# Patient Record
Sex: MALE | Race: BLACK OR AFRICAN AMERICAN | Employment: FULL TIME | URBAN - METROPOLITAN AREA
[De-identification: names, ages, dates, MRNs, and addresses within clinical notes are randomized per-mention and may not be internally consistent; named-entity substitution may affect disease eponyms.]

---

## 2021-09-30 ENCOUNTER — HOSPITAL ENCOUNTER (OUTPATIENT)
Dept: PHYSICAL THERAPY | Age: 56
Discharge: HOME | End: 2021-09-30
Payer: COMMERCIAL

## 2021-09-30 PROCEDURE — 97112 NEUROMUSCULAR REEDUCATION: CPT

## 2021-09-30 PROCEDURE — 97530 THERAPEUTIC ACTIVITIES: CPT

## 2021-09-30 PROCEDURE — 97162 PT EVAL MOD COMPLEX 30 MIN: CPT

## 2021-09-30 NOTE — PROGRESS NOTES
PT DAILY TREATMENT NOTE/LUMBAR EVAL     Patient Name: Uzma Chauhan  Date:2021  : 1965  [x]  Patient  Verified  Payor: BLUE CROSS / Plan: Ivan Joel [id number] PPO / Product Type: PPO /    In time:1130  Out time:1220  Total Treatment Time (min): 50  Visit #: 1 of 12    Medicare/BCBS Only   Total Timed Codes (min):  25 1:1 Treatment Time:  50     Treatment Area: Low back pain [M54.5]  SUBJECTIVE  Pain Level (0-10 scale): 3  []constant []intermittent []improving []worsening []no change since onset    Any medication changes, allergies to medications, adverse drug reactions, diagnosis change, or new procedure performed?: [x] No    [] Yes (see summary sheet for update)  Subjective functional status/changes: Patient reports s/p LS laminectomy in  followed by PT with good results but has experienced return of LBP since 2021. MRI positive for spinal stenosis LS with neurogenic claudication. Pain is sharp at times and radiating down both posterior legs to calf region max 10/10. Patient more comfortable in seated flexed position with min pain 0/10. Pain triggered by walking, going up/down steps, prolonged standing > 20 min. Pain reduction with flexed postures. Mostly pain free at night. Patient denies any other red flags beside bilateral intermittent sharp leg pain. Patient being sent to PT for 3 weeks to prevent surgery and is scheduled for injection on 10/4/21.      PLOF: ADL without LBP  Limitations to PLOF: difficulty with walking/standing  Mechanism of Injury: gradual progressive since 2021  Current symptoms/Complaints: LBP and bilateral posterior leg pain  Previous Treatment/Compliance: PT in 2016 with good results  PMHx/Surgical Hx: 2016 LS surgery, right knee scope x2 in   Work Hx: still on pay roll as  but out on restrictions due to LBP  Living Situation: 2 level home  Pt Goals: Get rid of this pain  Barriers: []pain []financial []time []transportation []other  Motivation: good  Substance use: []Alcohol []Tobacco []other:   FABQ Score: []low []elevate  Cognition: A & O x 3    Other:    OBJECTIVE/EXAMINATION  Domestic Life: WNL  Activity/Recreational Limitations: none currently, not working currently due to LBP  Mobility: WFL,   Self Care: occasional difficulty putting on pants in standing , mild LB tightness when putting on shoes            25 min [x]Eval                  []Re-Eval         10 min Therapeutic Activity:  [x]  See flow sheet :instruction in HEP and POC   Rationale: patient education  to improve the patients ability to perform HEP and participate in rehab     15 min Neuromuscular Re-education:  [x]  See flow sheet :   Rationale: improve coordination, increase proprioception and LS ES inhibition, core activation, breathing ex  to improve the patients ability to perform ADL with improved alignment             With   [] TE   [x] TA   [] neuro   [] other: Patient Education: [x] Review HEP    [] Progressed/Changed HEP based on:   [] positioning   [] body mechanics   [] transfers   [] heat/ice application    [] other:      Other Objective/Functional Measures: as per eval    Physical Therapy Evaluation - Lumbar Spine ([facility])    SUBJECTIVE  Chief Complaint:LB and sharp sudden bilateral leg pain    Mechanism of injury:gradual onset of LBP    Symptoms:  Aggravated by:   [] Bending [] Sitting [x] Standing [x] Walking   [] Moving [x] Cough [x] Sneeze [] Valsalva   [x] AM  [x] PM  Lying:  [] sup   [] pro   [] sidelying   [] Other:     Eased by:    [x] Bending [x] Sitting [] Standing [] Walking   [] Moving [] AM  [] PM  Lying: [] sup  [] pro  [x] sidelying   [] Other:     General Health:  Red Flags Indicated? [] Yes    [x] No  [] Yes [] No Recent weight change (If yes, due to dieting?  [] Yes  [] No)   [] Yes [] No Weakness in legs during walking  [] Yes [] No Unremitting pain at night  [] Yes [] No Abdominal pain or problems  [] Yes [] No Rectal bleeding  [] Yes [] No Feet more cold or painful in cold weather  [] Yes [] No Menstrual irregularities  [] Yes [] No Blood or pain with urination  [] Yes [] No Dysfunction of bowel or bladder  [] Yes [] No Recent illness within past 3 weeks (i.e, cold, flu)  [] Yes [] No Numbness/tingling in buttock/genitalia region    Past History/Treatments:PT post laminectomy in 2016     Diagnostic Tests: [] Lab work [x] X-rays    [] CT [x] MRI     [] Other:  Results:LS stenosis    Functional Status  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  Present functional limitations:limited tolerance to standing/walking, heavy lifting  What position do you sleep in?:S/L    OBJECTIVE  Posture:erect posture  Lateral Shift: [] R    [] L     [] +  [] -  Kyphosis: [] Increased [] Decreased   []  WNL  Lordosis:  [x] Increased [] Decreased   [] WNL  Pelvic symmetry: [x] WNL    [] Other:    Gait:  [] Normal     [x] Abnormal:mild guarding, reduction in trunk rotation/arm swing    Active Movements: [] N/A   [] Too acute   [] Other:  ROM % AROM % PROM Comments:pain, area   Forward flexion 40-60 5 in  LS tightness   Extension 20-30 25%  discomfort   SB right 20-30 22 in     SB left 20-30 23 in  LBP, no leg pain   Rotation right 5-10 19 in  LB tightness   Rotation left 5-10 18.5 in  LB tightness     Neuro Screen [x] WNL  Myotome/Dermatome/Reflexes:  Comments:    Dural Mobility:  SLR Sitting: [x] R    [x] L    [x] +    [] -  @ (degrees):pos in combination with slump test however not sharp, pain in both legs           Supine: [] R    [] L    [] +    [x] -  @ (degrees): SLR to 70 deg bilaterally, patient unable to differentiate between HS tightness or radiating pain  Slump Test: [] R    [] L    [] +    [x] -  @ (degrees): as above  Prone Knee Bend: [] R    [] L    [] +    [] -     Palpation: no TTP LS  [] Min  [] Mod  [] Severe    Location:  [] Min  [] Mod  [] Severe    Location:  [] Min  [] Mod  [] Severe    Location:    Strength   L(0-5) R (0-5) N/T   Hip Flexion (L1,2) 5 5 []   Knee Extension (L3,4) 5 5 []   Ankle Dorsiflexion (L4) 5 5 []   Great Toe Extension (L5) 5 5 []   Ankle Plantarflexion (S1) 5 5 []   Knee Flexion (S1,2) 5 5 []   Upper Abdominals 4 4 []   Lower Abdominals 4 4 []   Paraspinals   []   Back Rotators   []   Gluteus Luis Fernando 5 5 []   Other   []     Special Tests  Lumbar:  Lumb. Compression: [] Pos  [] Neg               Lumbar Distraction:   [] Pos  [] Neg    Quadrant:  [] Pos  [] Neg   [] Flex  [] Ext    Sacroilliac:  Gaenslen's: [] R    [] L    [] +    [] -     Compression: [] +    [] -     Gapping:  [] +    [] -     Thigh Thrust: [] R    [] L    [] +    [] -     Leg Length: [] +    [] -   Position:    Crests:    ASIS:    PSIS:    Sacral Sulcus:    Mobility: Standing flex:     Sitting flex:     Supine to sit:     Prone knee bend:         Hip: Eliverto Leech:  [] R    [] L    [] +    [] -     Scour:  [] R    [] L    [] +    [] -     Piriformis: [x] R    [x] L    [x] +    [] -          Deficits: Giuliano's: [x] R    [x] L    [x] +    [] -     Francisco: [x] R    [x] L    [x] +    [] -     Hamstrings 90/90:70/70    Gastrocsoleus (to neutral): Right: Left:       Global Muscular Weakness:  Abdominals:mild weakness  Quadratus Lumborum:tight  Paraspinals:LS tight  Other:bilateral hip tightness into IR    Other tests/comments:  hip IR both 20 deg with LBP at end range  Shouler Flex both 150 deg  HGIR both 45  ADT + bilaterally to midline, marked hip Flex tightness making it difficult to perform testing       Pain Level (0-10 scale) post treatment: 4    ASSESSMENT/Changes in Function: Patient able to perform all activities without reports of radicular leg pain however reports increase in LS stiffness with ambulation post session    Patient will continue to benefit from skilled PT services to address ROM deficits, address strength deficits, analyze and address soft tissue restrictions and analyze and cue movement patterns to attain remaining goals.      [x] See Plan of Care  []  See progress note/recertification  []  See Discharge Summary         Progress towards goals / Updated goals:  Patient has good understanding of initial HEP and POC    PLAN  []  Upgrade activities as tolerated     [x]  Continue plan of care  []  Update interventions per flow sheet       []  Discharge due to:_  []  Other:_      Fazal Abdullahi, STONE 9/30/2021  11:28 AM

## 2021-09-30 NOTE — PROGRESS NOTES
[facility] at the [address], [place], [address] [facility]  Phone: [phone]      Fax:  [phone]             Plan of Care/ Statement of Necessity for Physical Therapy Services      Patient name: Jamia Benavidez Start of Care: 2021   Referral source: Seda Bello : 1965    Medical Diagnosis: Low back pain [M54.5]   Onset Date:2021    Treatment Diagnosis: LBP, bilateral radicular leg pain   Prior Hospitalization: see medical history Provider#: [id number]   Medications: Verified on Patient summary List    Comorbidities: s/p LS laminectomy 2016, right knee scope x2 in    Prior Level of Function: full function, full time work at [employer] as , no LBP with standing/walking      The Plan of Care and following information is based on the information from the initial evaluation. Assessment/ key information: 50 YOM presenting to PT with c/o LB and intermittent sharp leg pain ranging from 0-10/10  since 2021. Recent x-rays and MRI indicative of LS stenosis. Patient reports most pain with standing, walking and heavy lifting. He is currently not working due to LBP. Patient denies any red flags except intermittent bilateral sharp burning pain in both posterior legs. Objective findings include limited LS ROM in all planes with pain during SB/Ext, symptoms are alleviated with spinal Flex, bilateral hamstring tightness, deficit in hip ROM (noel IR/Ext), deficit in trunk/shoulder mobility, erect posture with mild LS hyperlordosis, mild core strength deficit, guarding with ambulation, positive SLR testing in seated position in combination with Slump test. Symptoms are consistent with mechanical LBP and patient is a good candidate for skilled PT to address deficits in function.     Evaluation Complexity History MEDIUM  Complexity : 1-2 comorbidities / personal factors will impact the outcome/ POC ; Examination MEDIUM Complexity : 3 Standardized tests and measures addressing body structure, function, activity limitation and / or participation in recreation  ;Presentation MEDIUM Complexity : Evolving with changing characteristics  ; Clinical Decision Making MEDIUM Complexity : FOTO score of 26-74 FOTO score = an established functional score where 100 = no disability   Overall Complexity Rating: MEDIUM  Problem List: decrease ROM, decrease strength and decrease activity tolerance   Treatment Plan may include any combination of the following: Therapeutic exercise, Therapeutic activities, Neuromuscular re-education, Physical agent/modality, Manual therapy and Patient education  Vasopnuematic compression justification:  Per bilateral girth measures taken and listed above the edema is considered significant and having an impact on the patient's strength, ADLs and work duties, material handling  Patient / Family readiness to learn indicated by: trying to perform skills and interest  Persons(s) to be included in education: patient (P)  Barriers to Learning/Limitations: None  Patient Goal (s): Get rid of this pain  Patient Self Reported Health Status: good  Rehabilitation Potential: good    Short Term Goals: To be accomplished in 2 weeks:   1. Patient is compliant with daily HEP to address mechanical deficits and assure progress towards goals  Status at Eval: HEP initiated, discussed deficits    2. Patient demonstrates improved trunk rotation bilaterally to <or= 15 in indicating mobility needed for ADL and ambulation  Status at Eval: trunk rotation limited left 18.5\", right 19 in with LBP, stiffness with ambulation    3. Patient demonstrates HGIR to WNL and negative adduction drop test indicating  rib cage/hip mobility needed for ADL and gait  Status at Eval: HGIR 45 deg bilaterally, ADT positive bilaterally    Long Term Goals: To be accomplished in 4 weeks:   1.  Patient reports improved FOTO score to >or= 57/100 indicating improved tolerance to standing , walking and material handling to prepare patient for RTW  Status at [facility]:FOTO 42/100, limited standing to 20 min, pain triggered with ambulation    2. Patient reports reduction in pain to <or= 5/10 and reduction in leg pain episodes by >or= 50% for increased ease with ADL  Status at [facility]: pain ranging 0-10/10 with intermittent sharp burning bilateral leg pain     3. Patient demonstrates ability to perform SLR to >or= 70 deg without reports of increase in LB/leg pain to allow return to PLOF  Status at [facility]: SLR seated positive in combination with Slump test, Supine SLR limited to 70 deg with c/o LB/bilateral leg pain      Frequency / Duration: Patient to be seen 2 times per week for 4 weeks. Patient/ Caregiver education and instruction: Diagnosis, prognosis, exercises   [x]  Plan of care has been reviewed with PTA    Certification Period: n/a  Hubert Hicks, PT 9/30/2021 12:47 PM  _____________________________________________________________________  I certify that the above Therapy Services are being furnished while the patient is under my care. I agree with the treatment plan and certify that this therapy is necessary.     [facility] Signature:____________Date:_________TIME:________                                      [facility]    ** Signature, Date and Time must be completed for valid certification **    Please sign and return to [facility] at the [address], [facility] [place], [address] [facility]       Phone: [phone]      Fax:  [phone]

## 2021-10-14 ENCOUNTER — HOSPITAL ENCOUNTER (OUTPATIENT)
Dept: PHYSICAL THERAPY | Age: 56
Discharge: HOME | End: 2021-10-14
Payer: COMMERCIAL

## 2021-10-14 PROCEDURE — 97110 THERAPEUTIC EXERCISES: CPT

## 2021-10-14 PROCEDURE — 97530 THERAPEUTIC ACTIVITIES: CPT

## 2021-10-14 PROCEDURE — 97112 NEUROMUSCULAR REEDUCATION: CPT

## 2021-10-14 NOTE — PROGRESS NOTES
PT DAILY TREATMENT NOTE    Patient Name: Katerina Wick  Date:10/14/2021  : 1965  [x]  Patient  Verified  Payor: BLUE CROSS / Plan: Ivan Joel [id number] PPO / Product Type: PPO /    In time:12:16pm  Out time:1:05  Total Treatment Time (min): 49  Total Timed Codes (min): 49  1:1 Treatment Time (MC/BCBS only): 49   Visit #: 2 of 12    Treatment Dx:  Low back pain [M54.5]    SUBJECTIVE  Pain Level (0-10 scale): 3  Any medication changes, allergies to medications, adverse drug reactions, diagnosis change, or new procedure performed?: [x] No    [] Yes (see summary sheet for update)  Subjective functional status/changes:   [] No changes reported  The pt reported that he received a Cortizone shot approx 2 weeks that reduced the peripheralization symptom intensity butt not causing the symptoms of burning to go down the leg past     OBJECTIVE    20 min Therapeutic Exercise:  [x] See flow sheet :   Rationale: increase ROM and increase strength to improve the patients ability to return to PLOF    12 min Therapeutic Activity:  [x]  See flow sheet :    Rationale: increase ROM, increase strength and improve coordination  to improve the patients ability to perform daily activities with decreased pain and symptom levels     17 min Neuromuscular Re-education:  [x]  See flow sheet : Pt required multiple tactile cues, verbal cues, and demo for new MIHAI exercises in 90-90 position    Rationale: increase ROM, increase strength, improve coordination and increase proprioception  to improve the patients ability to perform daily activities with decreased pain and symptom levels    With   [] TE   [x] TA   [] neuro   [] other: Patient Education: [x] Review HEP; pt education of centralization and peripheralization; pt education of delayed onset muscle soreness; pt education of importance for completing HEP on a regular basis   [] Progressed/Changed HEP based on:   [] positioning   [] body mechanics   [] transfers   [] heat/ice application    [] other:      Other Objective/Functional Measures: updated pain goal      Pain Level (0-10 scale) post treatment: 4    ASSESSMENT/Changes in Function: The pt reported to therapy with a pleasant attitude and ready to participate in therapeutic exercises. Patient tolerated treatment session well today. Patient had no complaints with the new exercise program, but reported a slight increase in LBP and stiffness. Provided pt education discussed above  Patient continues to make slow progress toward goals and would benefit from continued skilled PT intervention to address remaining deficits outlined in goals below. Patient will continue to benefit from skilled PT services to modify and progress therapeutic interventions, address functional mobility deficits, address ROM deficits, address strength deficits, analyze and address soft tissue restrictions, analyze and cue movement patterns, analyze and modify body mechanics/ergonomics and assess and modify postural abnormalities to attain remaining goals. [x]  See Plan of Care  []  See progress note/recertification  []  See Discharge Summary         Progress towards goals / Updated goals:  Short Term Goals: To be accomplished in 2 weeks:              1. Patient is compliant with daily HEP to address mechanical deficits and assure progress towards goals  Status at Eval: HEP initiated, discussed deficits     2. Patient demonstrates improved trunk rotation bilaterally to <or= 15 in indicating mobility needed for ADL and ambulation  Status at Eval: trunk rotation limited left 18.5\", right 19 in with LBP, stiffness with ambulation     3. Patient demonstrates HGIR to WNL and negative adduction drop test indicating  rib cage/hip mobility needed for ADL and gait  Status at Eval: HGIR 45 deg bilaterally, ADT positive bilaterally     Long Term Goals:  To be accomplished in 4 weeks:              1. 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 Patient reports reduction in pain to <or= 5/10 and reduction in leg pain episodes by >or= 50% for increased ease with ADL  Status at [facility]: pain ranging 0-10/10 with intermittent sharp burning bilateral leg pain   Current 10/14/21: worst pain in the last week 10/10; average pain in the last week = 3/10 - progressing      3.  Patient demonstrates ability to perform SLR to >or= 70 deg without reports of increase in LB/leg pain to allow return to PLOF  Status at [facility]: SLR seated positive in combination with Slump test, Supine SLR limited to 70 deg with c/o LB/bilateral leg pain    PLAN  []  Upgrade activities as tolerated     [x]  Continue plan of care  []  Update interventions per flow sheet       []  Discharge due to:_  []  Other:_      Cheryl Langley, PT 10/14/2021  12:21 PM    Future Appointments   Date Time Provider [place]   10/19/2021 12:15 PM Isabel RuizHPNATHANW [facility]   10/21/2021 11:30 AM Man Tran PT [facility]   10/26/2021 10:45 AM Man Tran PT [facility]   10/28/2021 11:30 AM Man Tran PT [facility]   11/2/2021 10:00 AM Man Tran PT [facility]   11/4/2021 10:00 AM Man Tran PT [facility]   11/9/2021 10:45 AM Man Tran PT [facility]   11/11/2021 10:45 AM Merritt Thomas PT [facility]   11/16/2021 10:00 AM Man Tran PT [facility]   11/18/2021 10:00 AM Man Tran PT [facility]

## 2021-10-19 ENCOUNTER — APPOINTMENT (OUTPATIENT)
Dept: PHYSICAL THERAPY | Age: 56
End: 2021-10-19
Payer: COMMERCIAL

## 2021-10-21 ENCOUNTER — HOSPITAL ENCOUNTER (OUTPATIENT)
Dept: PHYSICAL THERAPY | Age: 56
Discharge: HOME | End: 2021-10-21
Payer: COMMERCIAL

## 2021-10-21 PROCEDURE — 97110 THERAPEUTIC EXERCISES: CPT

## 2021-10-21 PROCEDURE — 97140 MANUAL THERAPY 1/> REGIONS: CPT

## 2021-10-21 PROCEDURE — 97112 NEUROMUSCULAR REEDUCATION: CPT

## 2021-10-21 NOTE — PROGRESS NOTES
PT DAILY TREATMENT NOTE    Patient Name: Crispin Mcmahon  Date:10/21/2021  : 1965  [x]  Patient  Verified  Payor: BLUE CROSS / Plan: [facility] PPO / Product Type: PPO /    In time:1130  Out time:1222  Total Treatment Time (min): 52  Total Timed Codes (min): 52  1:1 Treatment Time (MC/BCBS only): 52   Visit #: 3 of 12    Treatment Dx: Low back pain [M54.5]    SUBJECTIVE  Pain Level (0-10 scale): 1 \"It is more tightness than pain\"  Any medication changes, allergies to medications, adverse drug reactions, diagnosis change, or new procedure performed?: [x] No    [] Yes (see summary sheet for update)  Subjective functional status/changes:   [] No changes reported  Patient reports reduction of leg pain. \"The pain is not radiating down as far\"  He reporting increased buttock pain right at sight of injection which patient is also feeling at rest    OBJECTIVE        10 min Therapeutic Exercise:  [x] See flow sheet :   Rationale: increase ROM, increase strength and improve coordination to improve the patients ability to perform ADL with more ease       25 min Neuromuscular Re-education:  [x]  See flow sheet :   Rationale: increase ROM, increase strength, improve coordination, increase proprioception and repositioning  to improve the patients ability to perform ADL with improved alignment and reduction in LS compensation    10 min Manual Therapy:   Sternal mobilization with active pelvic tilt, Superior T4 (MIHAI technique), Right subclavius release (MIHAI technique), MIHAI infraclavicular rib pump with active breath , rib mobs in combination with AA shoulder Flex  Obtained consent for hand placement    Rationale: decrease pain, increase ROM and increase tissue extensibility to improve the patients ability to perform ADL  The manual therapy interventions were performed at a separate and distinct time from the therapeutic activities interventions.               With   [] TE   [] TA   [] neuro   [] other: Patient Education: [x] Review HEP    [] Progressed/Changed HEP based on:   [] positioning   [] body mechanics   [] transfers   [] heat/ice application    [] other:      Other Objective/Functional Measures:   MIHAI Special Tests (pre/post)  HGIR:                                                 Right: 60/80             Left: 40/70  Shoulder Flexion   Right: 125/159             Left: 130/155  Hip Add Drop Test:                           Right: +/+            Left:+/+  Trunk Rot                                           Right: 18.5/16.5    Left 19 /17  Extension Drop                                   Right : +          Left: -  SLR both 80 deg with reports of right buttock pain/tightness    Pain Level (0-10 scale) post treatment: 0    ASSESSMENT/Changes in Function: improved measurements post session, reduction in pain, improved awareness of deficits in mobility    Patient will continue to benefit from skilled PT services to address ROM deficits, address strength deficits, analyze and address soft tissue restrictions, analyze and cue movement patterns and assess and modify postural abnormalities to attain remaining goals. [x]  See Plan of Care  []  See progress note/recertification  []  See Discharge Summary         Progress towards goals / Updated goals:  Short Term Goals: To be accomplished in 2 weeks:              5. Patient is compliant with daily HEP to address mechanical deficits and assure progress towards goals  Status at Eval: HEP initiated, discussed deficits     2. Patient demonstrates improved trunk rotation bilaterally to <or= 15 in indicating mobility needed for ADL and ambulation  Status at Eval: trunk rotation limited left 18.5\", right 19 in with LBP, stiffness with ambulation     3.  Patient demonstrates HGIR to WNL and negative adduction drop test indicating  rib cage/hip mobility needed for ADL and gait  Status at Eval: HGIR 45 deg bilaterally, ADT positive bilaterally  Current status 10/21/21: Kunal Yousif almost cleared, ADT still + post session, progressing     Long Term Goals: To be accomplished in 4 weeks:              1. Patient reports improved FOTO score to >or= 57/100 indicating improved tolerance to standing , walking and material handling to prepare patient for RTW  Status at [facility]:FOTO 42/100, limited standing to 20 min, pain triggered with ambulation     2. Patient reports reduction in pain to <or= 5/10 and reduction in leg pain episodes by >or= 50% for increased ease with ADL  Status at [facility]: pain ranging 0-10/10 with intermittent sharp burning bilateral leg pain   Current 10/14/21: worst pain in the last week 10/10; average pain in the last week = 3/10 - progressing      3.  Patient demonstrates ability to perform SLR to >or= 70 deg without reports of increase in LB/leg pain to allow return to PLOF  Status at [facility]: SLR seated positive in combination with Slump test, Supine SLR limited to 70 deg with c/o LB/bilateral leg pain   *    PLAN  []  Upgrade activities as tolerated     [x]  Continue plan of care  []  Update interventions per flow sheet       []  Discharge due to:_  []  Other:_      Pati Cho, PT 10/21/2021  11:45 AM    Future Appointments   Date Time Provider [place]   10/26/2021 10:45 AM Boyd Bonilla PT [facility]   10/28/2021 11:30 AM Boyd Bonilla PT [facility]   11/2/2021 10:00 AM Boyd Bonilla PT [facility]PTBW [facility]   11/4/2021 10:00 AM Boyd Bonilla PT [facility]PTBW [facility]   11/9/2021 10:45 AM Boyd Bonilla PT [facility]   11/11/2021 10:45 AM Karen Diallo PT [facility]PNATHANW [facility]   11/16/2021 10:00 AM Boyd Bonilla PT [facility]   11/18/2021 10:00 AM Boyd Bonilla PT [facility]PTBW [facility]

## 2021-10-26 ENCOUNTER — HOSPITAL ENCOUNTER (OUTPATIENT)
Dept: PHYSICAL THERAPY | Age: 56
Discharge: HOME | End: 2021-10-26
Payer: COMMERCIAL

## 2021-10-26 PROCEDURE — 97110 THERAPEUTIC EXERCISES: CPT

## 2021-10-26 PROCEDURE — 97530 THERAPEUTIC ACTIVITIES: CPT

## 2021-10-26 PROCEDURE — 97112 NEUROMUSCULAR REEDUCATION: CPT

## 2021-10-26 NOTE — PROGRESS NOTES
PT DAILY TREATMENT NOTE    Patient Name: Edenilson Avina  Date:10/26/2021  : 1965  [x]  Patient  Verified  Payor: BLUE CROSS / Plan: [facility] PPO / Product Type: PPO /    In time:1050  Out time:1130  Total Treatment Time (min): 40  Total Timed Codes (min): 40  1:1 Treatment Time (MC/BCBS only): 40   Visit #: 4 of 12    Treatment Dx:  Low back pain [M54.5]    SUBJECTIVE  Pain Level (0-10 scale): 3 LBP  Any medication changes, allergies to medications, adverse drug reactions, diagnosis change, or new procedure performed?: [x] No    [] Yes (see summary sheet for update)  Subjective functional status/changes:   [] No changes reported  Reports brief episode of sharp pain 10/10 this morning with radiating pain into  calf in both legs as he was walking downstairs    OBJECTIVE          10 min Therapeutic Exercise:  [x] See flow sheet :   Rationale: increase ROM, increase strength and improve coordination to improve the patients ability to perform ADL    10 min Therapeutic Activity:  [x]  See flow sheet :review of HEP to be used for pain control as needed, also review of log roll with bed transfers and improved spinal alignment when playing pool   Rationale: patient education  to improve the patients ability to perform ADL with more ease     20 min Neuromuscular Re-education:  [x]  See flow sheet :   Rationale: increase ROM, increase strength, improve coordination, increase proprioception and repositioning  to improve the patients ability to perform ADL with improved mechanics and alignment            With   [] TE   [x] TA   [] neuro   [] other: Patient Education: [x] Review HEP    [] Progressed/Changed HEP based on:   [] positioning   [] body mechanics   [] transfers   [] heat/ice application    [] other:      Other Objective/Functional Measures: bilateral hamstring tightness right <left, negative SLR     Pain Level (0-10 scale) post treatment: 0    ASSESSMENT/Changes in Function: challenged with advanced core ex however no increase in pain with TE, reports residual LS tightness but no LBP    Patient will continue to benefit from skilled PT services to address strength deficits, analyze and address soft tissue restrictions, analyze and cue movement patterns and assess and modify postural abnormalities to attain remaining goals. [x]  See Plan of Care  []  See progress note/recertification  []  See Discharge Summary         Progress towards goals / Updated goals:  Short Term Goals: To be accomplished in 2 weeks:              9. Patient is compliant with daily HEP to address mechanical deficits and assure progress towards goals  Status at Eval: HEP initiated, discussed deficits  Current status 10/26/21: reviewed HEP and use for pain control throughout the day     2. Patient demonstrates improved trunk rotation bilaterally to <or= 15 in indicating mobility needed for ADL and ambulation  Status at Eval: trunk rotation limited left 18.5\", right 19 in with LBP, stiffness with ambulation  Current status 10/26/21: trunk rotation after PT session left 16 in, right 16. 5, progrssing      3. Patient demonstrates HGIR to WNL and negative adduction drop test indicating  rib cage/hip mobility needed for ADL and gait  Status at [facility]: HGIR 45 deg bilaterally, ADT positive bilaterally  Current status 10/21/21: HGIR almost cleared, ADT still + post session, progressing     Long Term Goals: To be accomplished in 4 weeks:              1. Patient reports improved FOTO score to >or= 57/100 indicating improved tolerance to standing , walking and material handling to prepare patient for RTW  Status at [facility]:FOTO 42/100, limited standing to 20 min, pain triggered with ambulation     2.  Patient reports reduction in pain to <or= 5/10 and reduction in leg pain episodes by >or= 50% for increased ease with ADL  Status at Eval: pain ranging 0-10/10 with intermittent sharp burning bilateral leg pain   Current 10/14/21: worst pain in the last week 10/10; average pain in the last week = 3/10 - progressing        3.  Patient demonstrates ability to perform SLR to >or= 70 deg without reports of increase in LB/leg pain to allow return to PLOF  Status at Eval: SLR seated positive in combination with Slump test, Supine SLR limited to 70 deg with c/o LB/bilateral leg pain  Current status 10/26/21:SLR left 75 deg, right 80 deg without reports of buttock, negative SLR, MET      PLAN  []  Upgrade activities as tolerated     [x]  Continue plan of care  []  Update interventions per flow sheet       []  Discharge due to:_  []  Other:_      Dontrell Cabrera, PT 10/26/2021  11:18 AM    Future Appointments   Date Time Provider [place]   10/28/2021 11:30 AM Cee Jeffrey, PT [facility]I [facility]   11/2/2021 10:00 AM Cee Jeffrey, PT [facility]   11/4/2021 10:00 AM Cee Jeffrey, PT [facility]PTBW [facility]   11/9/2021 10:45 AM Cee Jeffrey, PT [facility]PTBW [facility]   11/11/2021 10:45 AM Priyank Wang, PT [facility]PTBW [facility]   11/16/2021 10:00 AM Cee Jeffrey, PT [facility]   11/18/2021 10:00 AM Cee Jeffrey, PT [facility]

## 2021-11-02 ENCOUNTER — HOSPITAL ENCOUNTER (OUTPATIENT)
Dept: PHYSICAL THERAPY | Age: 56
Discharge: HOME | End: 2021-11-02
Payer: COMMERCIAL

## 2021-11-02 PROCEDURE — 97110 THERAPEUTIC EXERCISES: CPT

## 2021-11-02 PROCEDURE — 97140 MANUAL THERAPY 1/> REGIONS: CPT

## 2021-11-02 PROCEDURE — 97112 NEUROMUSCULAR REEDUCATION: CPT

## 2021-11-02 NOTE — PROGRESS NOTES
[facility] at the [address], [place], [address] [facility]  Phone: [phone]      Fax:  [phone]    Progress Note  Patient name: Marycruz Fall Start of Care: 2021   Referral source: Lala Fernández [place] : 1965               Medical Diagnosis: Low back pain [M54.5]    Onset Date:2021               Treatment Diagnosis: LBP, bilateral radicular leg pain   Prior Hospitalization: see medical history Provider#: [id number]   Medications: Verified on Patient summary List    Comorbidities: s/p LS laminectomy 2016, right knee scope x2 in    Prior Level of Function: full function, full time work at [employer] as , no LBP with standing/walking                              Visits from Start of Care: 5    Missed Visits: 2    Progress towards goals / Updated goals:  Short Term Goals: To be accomplished in 2 weeks:              1. Patient is compliant with daily HEP to address mechanical deficits and assure progress towards goals  Status at Eval: HEP initiated, discussed deficits  Current status 10/26/21: reviewed HEP and use for pain control throughout the day     2. Patient demonstrates improved trunk rotation bilaterally to <or= 15 in indicating mobility needed for ADL and ambulation  Status at Eval: trunk rotation limited left 18.5\", right 19 in with LBP, stiffness with ambulation  Current status 21: trunk rotation after PT session 17 in bilaterally, progrssing      3. Patient demonstrates HGIR to WNL and negative adduction drop test indicating  rib cage/hip mobility needed for ADL and gait  Status at Eval: HGIR 45 deg bilaterally, ADT positive bilaterally  Current status 21: Maridee Heart  cleared left, almost cleared right, ADT negative post session, progressing     Long Term Goals: To be accomplished in 4 weeks:              1.  Patient reports improved FOTO score to >or= 57/100 indicating improved tolerance to standing , walking and material handling to prepare patient for RTW  Status at [facility]:FOTO 42/100, limited standing to 20 min, pain triggered with ambulation  Current status: pending     2. Patient reports reduction in pain to <or= 5/10 and reduction in leg pain episodes by >or= 50% for increased ease with ADL  Status at [facility]: pain ranging 0-10/10 with intermittent sharp burning bilateral leg pain   Current status 11/2/21: patient reports recent increase in pain with radiating pain into both upper legs however reported 4/10 pain at beginning of today's session and reduction to 2/10 post session without any radicular pain/symptoms, progressing        3. Patient demonstrates ability to perform SLR to >or= 70 deg without reports of increase in LB/leg pain to allow return to PLOF  Status at [facility]: SLR seated positive in combination with Slump test, Supine SLR limited to 70 deg with c/o LB/bilateral leg pain  Current status 10/26/21:SLR left 75 deg, right 80 deg without reports of buttock, negative SLR, MET       Key Functional Changes: reduction in leg pain with ADL  Updated Goals: to be achieved in 4 weeks:  LTG # 4/ Updated goal as of 11/2/21: patient demonstrates ability to perform material handling including pushing, pulling and lifting >or= 40# on occasion to prepare patient for RTW    ASSESSMENT/RECOMMENDATIONS:Mr. Cece Bhakta has attended a total of 5 visits including initial evaluation and has shown improvement in functional mobility, flexibility, reduction in radicular pain and negative SLR testing. He continues with fluctuating pain levels and recently reports increase in bilateral leg pain however continues to respond well to therapy with marked reduction in LBP and alleviation of radicular symptoms. I recommend to continue PT to address remaining goals.     [x]Continue therapy per initial plan/protocol at a frequency of  2 x per week for 4 weeks    Thank you for this referral.   Laly Collins, PT 11/2/2021 12:06 PM

## 2021-11-09 ENCOUNTER — HOSPITAL ENCOUNTER (OUTPATIENT)
Dept: PHYSICAL THERAPY | Age: 56
Discharge: HOME | End: 2021-11-09
Payer: COMMERCIAL

## 2021-11-09 PROCEDURE — 97140 MANUAL THERAPY 1/> REGIONS: CPT

## 2021-11-09 PROCEDURE — 97112 NEUROMUSCULAR REEDUCATION: CPT

## 2021-11-09 PROCEDURE — 97110 THERAPEUTIC EXERCISES: CPT

## 2021-11-09 NOTE — PROGRESS NOTES
PT DAILY TREATMENT NOTE    Patient Name: Nikita Peterson  Date:2021  : 1965  [x]  Patient  Verified  Payor: BLUE CROSS / Plan: [facility] PPO / Product Type: PPO /    In time:1043  Out time:1130  Total Treatment Time (min): 47  Total Timed Codes (min): 47  1:1 Treatment Time (MC/BCBS only): 47   Visit #: 6 of 13    Treatment Dx: Other low back pain [M54.59]    SUBJECTIVE  Pain Level (0-10 scale): 3  Any medication changes, allergies to medications, adverse drug reactions, diagnosis change, or new procedure performed?: [x] No    [] Yes (see summary sheet for update)  Subjective functional status/changes:   [] No changes reported  Reports most pain when standing or walking, severe pain when doing dishes on  , reports shooting pain to the back of his knees, pain resolved once seated    OBJECTIVE    10 min Therapeutic Exercise:  [x] See flow sheet :   Rationale: increase ROM, increase strength and improve coordination to improve the patients ability to perform ADL       29 min Neuromuscular Re-education:  [x]  See flow sheet :   Rationale: increase ROM, increase strength, improve coordination, increase proprioception and repositioning  to improve the patients ability to perform ADL with improved alignment    8 min Manual Therapy:  Sternal mobilization with active pelvic tilt,  Right subclavius release (MIHAI technique), MIHAI infraclavicular rib pump with active breath   Obtained consent for hand placement    Rationale: decrease pain, increase ROM and increase tissue extensibility to improve the patients ability to perform ADL with improved rib cage mobility  The manual therapy interventions were performed at a separate and distinct time from the therapeutic activities interventions.     With   [] TE   [] TA   [] neuro   [] other: Patient Education: [x] Review HEP    [] Progressed/Changed HEP based on:   [] positioning   [] body mechanics   [] transfers   [] heat/ice application    [] other: Other Objective/Functional Measures:   -patient very challenged with HS and PPT while reaching overhead  Pre/post measurements:  FFD 5 in/3 in  LTR both 18 in/  Left 16.5 in, right 16  SLR both 65 without leg pain/left 80, right 85  HGIR post session left 80, right 85  ADT both +, hip Flex tightness both +/NT     Pain Level (0-10 scale) post treatment: 2    ASSESSMENT/Changes in Function: improved measurements post session indicating improvement in rib cage mobility and lumbopelvic alignment however almost immediate slight increase in LBP once patient is in upright position/walking    Patient will continue to benefit from skilled PT services to address ROM deficits, address strength deficits, analyze and address soft tissue restrictions, analyze and cue movement patterns and assess and modify postural abnormalities to attain remaining goals. [x]  See Plan of Care  []  See progress note/recertification  []  See Discharge Summary         Progress towards goals / Updated goals:  Short Term Goals: To be accomplished in 2 weeks:              1. Patient is compliant with daily HEP to address mechanical deficits and assure progress towards goals  Status at Eval: HEP initiated, discussed deficits  Current status 10/26/21: reviewed HEP and use for pain control throughout the day     2. Patient demonstrates improved trunk rotation bilaterally to <or= 15 in indicating mobility needed for ADL and ambulation  Status at Eval: trunk rotation limited left 18.5\", right 19 in with LBP, stiffness with ambulation  Current status 11/9/21: trunk rotation after PT session 16.5 left, 16 in right, progrssing      3.  Patient demonstrates HGIR to WNL and negative adduction drop test indicating  rib cage/hip mobility needed for ADL and gait  Status at [facility]: HGIR 45 deg bilaterally, ADT positive bilaterally  Current status 11/2/21: Rosea Friends  cleared left, almost cleared right, ADT negative post session, progressing     Long Term Goals: To be accomplished in 4 weeks:              1.  Patient reports improved FOTO score to >or= 57/100 indicating improved tolerance to standing , walking and material handling to prepare patient for RTW  Status at [facility]:FOTO 42/100, limited standing to 20 min, pain triggered with ambulation  Current status:[place]     2. Patient reports reduction in pain to <or= 5/10 and reduction in leg pain episodes by >or= 50% for increased ease with ADL  Status at [facility]: pain ranging 0-10/10 with intermittent sharp burning bilateral leg pain   Current status 11/2/21: patient reports recent increase in pain with radiating pain into both upper legs however reported 4/10 pain at beginning of today's session and reduction to 2/10 post session without any radicular pain/symptoms, progressing  Current status 11/9/21: no LBP with QP or 90/90 activities, progressing         3. Patient demonstrates ability to perform SLR to >or= 70 deg without reports of increase in LB/leg pain to allow return to PLOF  Status at [facility]: SLR seated positive in combination with Slump test, Supine SLR limited to 70 deg with c/o LB/bilateral leg pain  Current status 10/26/21:SLR left 75 deg, right 80 deg without reports of buttock, negative SLR, MET        Key Functional Changes: reduction in leg pain with ADL  Updated Goals: to be achieved in 4 weeks:  LTG # 4/ Updated goal as of 11/2/21: patient demonstrates ability to perform material handling including pushing, pulling and lifting >or= 40# on occasion to prepare patient for RTW    PLAN  []  Upgrade activities as tolerated     [x]  Continue plan of care  []  Update interventions per flow sheet       []  Discharge due to:_  []  Other:_      German Coon PT 11/9/2021  10:54 AM    Future Appointments   Date Time Provider [place]   11/11/2021 10:45 AM Kiel Pimentel, PT ANDI [facility]   11/16/2021 10:00 AM Ra Herrera, [facility]   11/18/2021 10:00 AM Ra Herrera, [facility]

## 2021-11-11 ENCOUNTER — HOSPITAL ENCOUNTER (OUTPATIENT)
Dept: PHYSICAL THERAPY | Age: 56
Discharge: HOME | End: 2021-11-11
Payer: COMMERCIAL

## 2021-11-11 PROCEDURE — 97530 THERAPEUTIC ACTIVITIES: CPT

## 2021-11-11 PROCEDURE — 97112 NEUROMUSCULAR REEDUCATION: CPT

## 2021-11-11 PROCEDURE — 97110 THERAPEUTIC EXERCISES: CPT

## 2021-11-11 NOTE — PROGRESS NOTES
PT DAILY TREATMENT NOTE    Patient Name: Aashish Saez  Date:2021  : 1965  [x]  Patient  Verified  Payor: BLUE CROSS / Plan: Ivan Joel [id number] PPO / Product Type: PPO /    In time:10:45  Out time:11:41  Total Treatment Time (min): 56  Total Timed Codes (min): 56  1:1 Treatment Time (MC/BCBS only): 38   Visit #: 7 of 13    Treatment Dx: Other low back pain [M54.59]    SUBJECTIVE  Pain Level (0-10 scale): 0/10  Any medication changes, allergies to medications, adverse drug reactions, diagnosis change, or new procedure performed?: [x] No    [] Yes (see summary sheet for update)  Subjective functional status/changes:   [] No changes reported  Pt reports he was tired after last therapy session. Reports that he is compliant with HEP, when he has time.      OBJECTIVE    16 min Therapeutic Exercise:  [] See flow sheet :   Rationale: increase ROM, increase strength, improve coordination, improve balance and increase proprioception to improve the patients ability to perform daily activities with decreased pain and symptom levels      20 min Therapeutic Activity:  []  See flow sheet :   Rationale: increase ROM, increase strength, improve coordination, improve balance and increase proprioception  to improve the patients ability to perform daily activities with decreased pain and symptom levels       15 min Neuromuscular Re-education:  []  See flow sheet :   Rationale: increase ROM, increase strength, improve coordination, improve balance and increase proprioception  to improve the patients ability to perform daily activities with decreased pain and symptom levels      5  NC min Manual Therapy: , MIHAI infraclavicular rib pump with active breath, rib mobs with active breath with T8 flex and hooklying position   Obtained consent for hand placement    Rationale: decrease pain, increase ROM, increase tissue extensibility, decrease trigger points and increase postural awareness to improve the patients ability to perform daily activities with decreased pain and symptom levels    The manual therapy interventions were performed at a separate and distinct time from the therapeutic activities interventions. With   [x] TE   [x] TA   [] neuro   [] other: Patient Education: [x] Review HEP    [] Progressed/Changed HEP based on:   [x] positioning   [x] body mechanics   [] transfers   [] heat/ice application    [] other:      Other Objective/Functional Measures: please see below     Pain Level (0-10 scale) post treatment: 0/10    ASSESSMENT/Changes in Function: Patient tolerated therapy session well as there were no adverse reactions today. Pt does require tactile cuing for PPT in the QP position. Pt having a hard time with bridges, glutes barely clearing the table. Pt is progressing with therapy as indicated by pt tolerating increase in exercise repetitions and resistance. Although showing progress patient would benefit from continuation of skilled physical therapy to address the remaining limitations. Patient will continue to benefit from skilled PT services to modify and progress therapeutic interventions, address functional mobility deficits, address ROM deficits, address strength deficits, analyze and address soft tissue restrictions, analyze and cue movement patterns, analyze and modify body mechanics/ergonomics, assess and modify postural abnormalities and instruct in home and community integration to attain remaining goals. [x]  See Plan of Care  []  See progress note/recertification  []  See Discharge Summary         Progress towards goals / Updated goals:  Short Term Goals: To be accomplished in 2 weeks:              3. Patient is compliant with daily HEP to address mechanical deficits and assure progress towards goals  Status at Eval: HEP initiated, discussed deficits  Current status 11/11/21: pt reports that he does the HEP when he can-reviewed importance of doing HEP     2.  Patient demonstrates improved trunk rotation bilaterally to <or= 15 in indicating mobility needed for ADL and ambulation  Status at Eval: trunk rotation limited left 18.5\", right 19 in with LBP, stiffness with ambulation  Current status 11/9/21: trunk rotation after PT session 16.5 left, 16 in right, progrssing      3. Patient demonstrates HGIR to WNL and negative adduction drop test indicating  rib cage/hip mobility needed for ADL and gait  Status at [facility]: HGIR 45 deg bilaterally, ADT positive bilaterally  Current status 11/2/21: HGIR  cleared left, almost cleared right, ADT negative post session, progressing     Long Term Goals: To be accomplished in 4 weeks:              1. Patient reports improved FOTO score to >or= 57/100 indicating improved tolerance to standing , walking and material handling to prepare patient for RTW  Status at [facility]:FOTO 42/100, limited standing to 20 min, pain triggered with ambulation  Current status:11/11/21 47-progressing      2.  Patient reports reduction in pain to <or= 5/10 and reduction in leg pain episodes by >or= 50% for increased ease with ADL  Status at [facility]: pain ranging 0-10/10 with intermittent sharp burning bilateral leg pain   Current status 11/2/21: patient reports recent increase in pain with radiating pain into both upper legs however reported 4/10 pain at beginning of today's session and reduction to 2/10 post session without any radicular pain/symptoms, progressing  Current status 11/11/21:rates pain 01/0 this therapy session progressing         3. Patient demonstrates ability to perform SLR to >or= 70 deg without reports of increase in LB/leg pain to allow return to PLOF  Status at [facility]: SLR seated positive in combination with Slump test, Supine SLR limited to 70 deg with c/o LB/bilateral leg pain  Current status 10/26/21:SLR left 75 deg, right 80 deg without reports of buttock, negative SLR, MET      Updated Goals: to be achieved in 4 weeks:  LTG # 4/ Updated goal as of 11/2/21: patient demonstrates ability to perform material handling including pushing, pulling and lifting >or= 40# on occasion to prepare patient for RTW    PLAN  [x]  Upgrade activities as tolerated     [x]  Continue plan of care  []  Update interventions per flow sheet       []  Discharge due to:_  []  Other:_      Tony Winter, PT, DPT, CIM[facility] 11/11/2021  9:48 AM    Future Appointments   Date Time Provider Marion Silveira   11/11/2021 10:45 AM STONE ToddHPSHAHRIAR [facility]   11/16/2021 10:00 AM STONE Granger [facility]   11/18/2021 10:00 AM STONE Granger [facility]

## 2021-11-16 ENCOUNTER — APPOINTMENT (OUTPATIENT)
Dept: PHYSICAL THERAPY | Age: 56
End: 2021-11-16
Payer: COMMERCIAL

## 2021-11-16 NOTE — PROGRESS NOTES
[facility]  [address] [place] [address], [address]  Tel. [phone]  Fax: [phone]    Physical Therapy Discharge Summary    Patient Name: Tamala Dakins : 1965   Treatment/Medical Diagnosis: Other low back pain [M54.59]   Referral Source: [place], [place]     Date of Initial Visit: 21 Attended Visits: 7 Missed Visits: 4       SUMMARY OF TREATMENT  Pt attended only initial evaluation and     6     follow-ups and then did not return. Therefore a formal reassessment of goals was not performed. RECOMMENDATIONS  Discontinue physical therapy due to patient not returning. If you have any questions/comments please contact us directly at [phone]. Thank you for allowing us to assist in the care of your patient.     Therapist Signature: Nathalie Vazquez PT Date: 21     Time: 3:29 PM

## 2021-11-18 ENCOUNTER — APPOINTMENT (OUTPATIENT)
Dept: PHYSICAL THERAPY | Age: 56
End: 2021-11-18
Payer: COMMERCIAL
